# Patient Record
Sex: FEMALE | URBAN - METROPOLITAN AREA
[De-identification: names, ages, dates, MRNs, and addresses within clinical notes are randomized per-mention and may not be internally consistent; named-entity substitution may affect disease eponyms.]

---

## 2019-04-16 ENCOUNTER — OFFICE VISIT (OUTPATIENT)
Dept: URBAN - METROPOLITAN AREA CLINIC 48 | Facility: CLINIC | Age: 79
End: 2019-04-16
Payer: MEDICARE

## 2019-04-16 PROCEDURE — 99213 OFFICE O/P EST LOW 20 MIN: CPT | Performed by: OPHTHALMOLOGY

## 2019-04-16 RX ORDER — BRIMONIDINE TARTRATE 2 MG/ML
0.2 % SOLUTION/ DROPS OPHTHALMIC
Qty: 1 | Refills: 2 | Status: INACTIVE
Start: 2019-04-16 | End: 2019-05-14

## 2019-04-16 RX ORDER — VALACYCLOVIR 500 MG/1
500 MG TABLET ORAL
Qty: 30 | Refills: 0 | Status: INACTIVE
Start: 2019-04-16 | End: 2019-05-17

## 2019-04-16 ASSESSMENT — INTRAOCULAR PRESSURE
OD: 15
OS: 30

## 2019-04-16 NOTE — IMPRESSION/PLAN
Impression: Other corneal scar: H17.89. Plan: not active at this time, continue to monitor. continue:
- PF 1 gtt every other day for 2 week then stop. - Valacyclovir 500mg 1/2 tablet a day 

start :
- Brimonidine tid OS do to elevated pressure RTC 1 week IOP check

## 2019-04-24 ENCOUNTER — OFFICE VISIT (OUTPATIENT)
Dept: URBAN - METROPOLITAN AREA CLINIC 48 | Facility: CLINIC | Age: 79
End: 2019-04-24
Payer: MEDICARE

## 2019-04-24 DIAGNOSIS — H17.89 OTHER CORNEAL SCAR: Primary | ICD-10-CM

## 2019-04-24 PROCEDURE — 92012 INTRM OPH EXAM EST PATIENT: CPT | Performed by: OPHTHALMOLOGY

## 2019-04-24 ASSESSMENT — INTRAOCULAR PRESSURE
OD: 18
OS: 18

## 2019-04-24 NOTE — IMPRESSION/PLAN
Impression: Other corneal scar: H17.89. Plan: not active at this time, pressure decreased with medication, continue to monitor. continue: - Valacyclovir 500mg 1/2 tablet a day PF Refresh  4-12 times a day Discontinue Brimonidine trial basis RTC 1 week IOP check

## 2019-05-07 ENCOUNTER — OFFICE VISIT (OUTPATIENT)
Dept: URBAN - METROPOLITAN AREA CLINIC 48 | Facility: CLINIC | Age: 79
End: 2019-05-07
Payer: MEDICARE

## 2019-05-07 PROCEDURE — 92012 INTRM OPH EXAM EST PATIENT: CPT | Performed by: OPHTHALMOLOGY

## 2019-05-07 ASSESSMENT — INTRAOCULAR PRESSURE: OS: 17

## 2019-05-14 ENCOUNTER — OFFICE VISIT (OUTPATIENT)
Dept: URBAN - METROPOLITAN AREA CLINIC 48 | Facility: CLINIC | Age: 79
End: 2019-05-14
Payer: MEDICARE

## 2019-05-14 PROCEDURE — 92012 INTRM OPH EXAM EST PATIENT: CPT | Performed by: OPHTHALMOLOGY

## 2019-05-14 RX ORDER — GANCICLOVIR 1.5 MG/G
0.15 % GEL OPHTHALMIC
Qty: 1 | Refills: 2 | Status: INACTIVE
Start: 2019-05-14 | End: 2019-07-02

## 2019-05-14 ASSESSMENT — INTRAOCULAR PRESSURE: OS: 24

## 2019-05-14 NOTE — IMPRESSION/PLAN
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 Plan: pressure rise now off brimonidine, new reactivation with decrease of antiviral . Patient to use:
- PF refresh tid OS
- Valacyclovir 500mg tid PO 
- Zirgan tid OS if not covered then we will use Trifluridine tid OS 

RTC Friday IOP check and follow up

## 2019-05-17 ENCOUNTER — OFFICE VISIT (OUTPATIENT)
Dept: URBAN - METROPOLITAN AREA CLINIC 48 | Facility: CLINIC | Age: 79
End: 2019-05-17
Payer: MEDICARE

## 2019-05-17 PROCEDURE — 92012 INTRM OPH EXAM EST PATIENT: CPT | Performed by: OPHTHALMOLOGY

## 2019-05-17 RX ORDER — VALACYCLOVIR 500 MG/1
500 MG TABLET ORAL
Qty: 90 | Refills: 0 | Status: INACTIVE
Start: 2019-05-17 | End: 2019-07-02

## 2019-05-17 ASSESSMENT — INTRAOCULAR PRESSURE: OS: 16

## 2019-05-17 NOTE — IMPRESSION/PLAN
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 Plan: Discussed treatment plan with patient, patient understood. Patient to use:
- PF refresh tid OS
- Valacyclovir 500mg tid PO 
- Zirgan 5 times a day OS 

RTC Wednesday with Dr. Shilpi Estes please make appointment mid morning when Dr. Ambar Ansari is in clinic.

## 2019-06-07 ENCOUNTER — OFFICE VISIT (OUTPATIENT)
Dept: URBAN - METROPOLITAN AREA CLINIC 48 | Facility: CLINIC | Age: 79
End: 2019-06-07
Payer: MEDICARE

## 2019-06-07 PROCEDURE — 92012 INTRM OPH EXAM EST PATIENT: CPT | Performed by: OPHTHALMOLOGY

## 2019-06-07 RX ORDER — PREDNISOLONE ACETATE 10 MG/ML
1 % SUSPENSION/ DROPS OPHTHALMIC
Qty: 3 | Refills: 4 | Status: INACTIVE
Start: 2019-06-07 | End: 2019-07-02

## 2019-06-07 ASSESSMENT — INTRAOCULAR PRESSURE
OS: 19
OD: 16

## 2019-06-07 NOTE — IMPRESSION/PLAN
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 Plan: Discussed treatment plan with patient, patient understood. 
Patient to use:
- PF refresh tid OS
- Valacyclovir 500mg tid PO 
- Zirgan 5 times a day OS 
- PF 10 times a day OS 
- Brimonidine QAM OS

RTC 1 week Dr. Juan M Rios

## 2019-06-14 ENCOUNTER — OFFICE VISIT (OUTPATIENT)
Dept: URBAN - METROPOLITAN AREA CLINIC 48 | Facility: CLINIC | Age: 79
End: 2019-06-14
Payer: MEDICARE

## 2019-06-14 PROCEDURE — 92012 INTRM OPH EXAM EST PATIENT: CPT | Performed by: OPHTHALMOLOGY

## 2019-06-14 ASSESSMENT — INTRAOCULAR PRESSURE: OS: 14

## 2019-06-14 NOTE — IMPRESSION/PLAN
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 There is no history of kidney problems Plan: Discussed treatment plan with patient, patient understood. 
Patient to use:
- PF refresh tid to 6 times a day OS
- Valacyclovir 500mg Bid PO 
- Zirgan tid  times a day OS 
- PF 10 times a day OS 
- Brimonidine QAM OS

RTC 1 week

## 2019-06-20 ENCOUNTER — OFFICE VISIT (OUTPATIENT)
Dept: URBAN - METROPOLITAN AREA CLINIC 48 | Facility: CLINIC | Age: 79
End: 2019-06-20
Payer: MEDICARE

## 2019-06-20 PROCEDURE — 92012 INTRM OPH EXAM EST PATIENT: CPT | Performed by: OPHTHALMOLOGY

## 2019-06-20 ASSESSMENT — INTRAOCULAR PRESSURE: OS: 20

## 2019-06-20 NOTE — IMPRESSION/PLAN
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 Plan: Discussed treatment plan with patient, patient understood. Patient to use:
- PF refresh every hour your not using other drops - Valacyclovir 500mg tid PO 
- Zirgan QD OS 
- PF 8 times a day OS 
- Brimonidine bid  OS

RTC July 2nd with Dr. Yen May If patient has any new decreased vision please call to be seen with Dr. Deneen Bolden

## 2019-07-02 ENCOUNTER — OFFICE VISIT (OUTPATIENT)
Dept: URBAN - METROPOLITAN AREA CLINIC 48 | Facility: CLINIC | Age: 79
End: 2019-07-02
Payer: MEDICARE

## 2019-07-02 PROCEDURE — 92012 INTRM OPH EXAM EST PATIENT: CPT | Performed by: OPHTHALMOLOGY

## 2019-07-02 RX ORDER — GANCICLOVIR 1.5 MG/G
0.15 % GEL OPHTHALMIC
Qty: 1 | Refills: 2 | Status: INACTIVE
Start: 2019-07-02 | End: 2019-07-17

## 2019-07-02 RX ORDER — PREDNISOLONE ACETATE 10 MG/ML
1 % SUSPENSION/ DROPS OPHTHALMIC
Qty: 3 | Refills: 4 | Status: INACTIVE
Start: 2019-07-02 | End: 2019-07-17

## 2019-07-02 RX ORDER — FAMCICLOVIR 250 MG/1
250 MG TABLET ORAL
Qty: 90 | Refills: 2 | Status: INACTIVE
Start: 2019-07-02 | End: 2019-08-01

## 2019-07-02 ASSESSMENT — INTRAOCULAR PRESSURE
OD: 19
OS: 20

## 2019-07-02 NOTE — IMPRESSION/PLAN
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 Patient to use:
- PF refresh every hour your not using other drops - Zirgan tid  OS 
- PF tid x 3 days then bid x 3 days then qd x 3 days - Brimonidine bid  OS
restart Famciclovir 250 mg tid PO Discontinue Valacyclovir RTC 1 week f/up with Dr. Trenton Medrano

## 2019-07-09 ENCOUNTER — OFFICE VISIT (OUTPATIENT)
Dept: URBAN - METROPOLITAN AREA CLINIC 48 | Facility: CLINIC | Age: 79
End: 2019-07-09
Payer: MEDICARE

## 2019-07-09 PROCEDURE — 92012 INTRM OPH EXAM EST PATIENT: CPT | Performed by: OPHTHALMOLOGY

## 2019-07-09 ASSESSMENT — INTRAOCULAR PRESSURE: OS: 19

## 2019-07-09 NOTE — IMPRESSION/PLAN
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 ? epithelial debridement. Plan: Discussed treatment plan with patient, patient understood. Patient to use:
- PF refresh every hour your not using other drops - Zirgan 5 times a day   OS 
- PF  qd x 2 days - Brimonidine bid  OS
- Famciclovir 250 mg tid PO Photo taken today by Dr. Andrea Bowman Refer to Dr. Johanny Simmons Monday RTC  2 weeks with Dr. Andrea Bowman

## 2019-07-17 ENCOUNTER — OFFICE VISIT (OUTPATIENT)
Dept: URBAN - METROPOLITAN AREA CLINIC 29 | Facility: CLINIC | Age: 79
End: 2019-07-17
Payer: MEDICARE

## 2019-07-17 PROCEDURE — 99213 OFFICE O/P EST LOW 20 MIN: CPT | Performed by: OPHTHALMOLOGY

## 2019-07-17 RX ORDER — TRIFLURIDINE 1 G/100ML
1 % SOLUTION OPHTHALMIC
Qty: 1 | Refills: 4 | Status: ACTIVE
Start: 2019-07-17

## 2019-07-17 ASSESSMENT — INTRAOCULAR PRESSURE
OD: 19
OS: 21

## 2019-07-17 NOTE — IMPRESSION/PLAN
Impression: Herpesviral keratitis: B00.52. Condition: established, stable. Symptoms: will treat with meds. Plan: d/c zirgan and change to viroptic qid - ?resistant to zirgan?  no steroid for now, if viroptic works, taper relatively slowly.

## 2019-07-23 ENCOUNTER — OFFICE VISIT (OUTPATIENT)
Dept: URBAN - METROPOLITAN AREA CLINIC 48 | Facility: CLINIC | Age: 79
End: 2019-07-23
Payer: MEDICARE

## 2019-07-23 PROCEDURE — 92012 INTRM OPH EXAM EST PATIENT: CPT | Performed by: OPHTHALMOLOGY

## 2019-07-23 ASSESSMENT — INTRAOCULAR PRESSURE
OS: 20
OD: 21

## 2019-07-23 NOTE — IMPRESSION/PLAN
Impression: Herpesviral keratitis: B00.52. 
? neurotrophic ulcer Plan: Patient to continue: - Trifluridine 5 times a day OS 
- Brimonidine bid OS
- PF aft's Discontinue :
- [facility] RTC friday follow up with Dr. Ace Knox

## 2019-07-26 ENCOUNTER — OFFICE VISIT (OUTPATIENT)
Dept: URBAN - METROPOLITAN AREA CLINIC 48 | Facility: CLINIC | Age: 79
End: 2019-07-26
Payer: MEDICARE

## 2019-07-26 PROCEDURE — 92012 INTRM OPH EXAM EST PATIENT: CPT | Performed by: OPHTHALMOLOGY

## 2019-07-26 PROCEDURE — 92285 EXTERNAL OCULAR PHOTOGRAPHY: CPT | Performed by: OPHTHALMOLOGY

## 2019-07-26 RX ORDER — TRIFLURIDINE 1 G/100ML
1 % SOLUTION OPHTHALMIC
Qty: 15 | Refills: 3 | Status: ACTIVE
Start: 2019-07-26

## 2019-07-26 ASSESSMENT — INTRAOCULAR PRESSURE
OS: 15
OD: 16

## 2019-07-26 NOTE — IMPRESSION/PLAN
Impression: Herpesviral keratitis: B00.52. 
? neurotrophic ulcer Photo taken today  Plan: Patient to continue: - Trifluridine 9 times a day OS 
- Brimonidine bid OS
- PF aft's 
- famciclovir  250 mg tid PO Patient having more pain and sensitive to light OS x 1 day Patient to call office if there is any unbearable pain,
Dr. Montrell Amaro to call Dr. Og Duran to see if PF can be restarted. If so than Dr. Montrell Amaro will call patient to let them know new instruction on medication.  

RTC Wednesday  Dr. Montrell Amaro

## 2019-07-31 ENCOUNTER — OFFICE VISIT (OUTPATIENT)
Dept: URBAN - METROPOLITAN AREA CLINIC 48 | Facility: CLINIC | Age: 79
End: 2019-07-31
Payer: MEDICARE

## 2019-07-31 PROCEDURE — 92012 INTRM OPH EXAM EST PATIENT: CPT | Performed by: OPHTHALMOLOGY

## 2019-07-31 RX ORDER — BRIMONIDINE TARTRATE 2 MG/ML
0.2 % SOLUTION/ DROPS OPHTHALMIC
Qty: 15 | Refills: 2 | Status: INACTIVE
Start: 2019-07-31 | End: 2019-09-05

## 2019-07-31 ASSESSMENT — INTRAOCULAR PRESSURE: OS: 22

## 2019-07-31 NOTE — IMPRESSION/PLAN
Impression: Herpesviral keratitis: B00.52. 
? neurotrophic ulcer Plan: Patient to continue: - Trifluridine 5 times a day OS 
- Brimonidine bid OS
- PF 5 times a day OS 
- PF aft's 5-6 times a day OS 
- famciclovir  250 mg tid PO 

RTC Friday follow up , please don't check pressure don't put any drops in patient's eyes, Dr. De La Cruz Simple to look at [facility] before drops.

## 2019-08-06 ENCOUNTER — OFFICE VISIT (OUTPATIENT)
Dept: URBAN - METROPOLITAN AREA CLINIC 48 | Facility: CLINIC | Age: 79
End: 2019-08-06
Payer: MEDICARE

## 2019-08-06 PROCEDURE — 92012 INTRM OPH EXAM EST PATIENT: CPT | Performed by: OPHTHALMOLOGY

## 2019-08-06 ASSESSMENT — INTRAOCULAR PRESSURE: OS: 16

## 2019-08-06 NOTE — IMPRESSION/PLAN
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
stromal haze in the center vision. Plan: Patient to continue: - Brimonidine QD OS
- PF QD OS 
- PF every 30-60 minutes while awake - Valacyclovir 500 mg tid PO Restart:
Zirgan qid OS Patient to discontinue  Trifluridine RTC Thursday morning,(please do not put any drops in patients eyes, not even to check pressure).

## 2019-08-08 ENCOUNTER — OFFICE VISIT (OUTPATIENT)
Dept: URBAN - METROPOLITAN AREA CLINIC 48 | Facility: CLINIC | Age: 79
End: 2019-08-08
Payer: MEDICARE

## 2019-08-08 DIAGNOSIS — B00.52 HERPESVIRAL KERATITIS: Primary | ICD-10-CM

## 2019-08-08 PROCEDURE — 92012 INTRM OPH EXAM EST PATIENT: CPT | Performed by: OPHTHALMOLOGY

## 2019-08-08 ASSESSMENT — INTRAOCULAR PRESSURE: OS: 21

## 2019-08-08 NOTE — IMPRESSION/PLAN
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
stromal haze in the center vision. Plan: Patient to continue: - Brimonidine BID  OS
- PF BID OS 
- PF every 30-60 minutes while awake - Valacyclovir 500 mg tid PO 
- Trifluridine bid OS
_ofloxacin bid OS [place] Appointment to be made with Dr. Violeta Crow 2 weeks RTC Saturday at [address] [place] [address]

## 2019-08-14 ENCOUNTER — OFFICE VISIT (OUTPATIENT)
Dept: URBAN - METROPOLITAN AREA CLINIC 48 | Facility: CLINIC | Age: 79
End: 2019-08-14
Payer: MEDICARE

## 2019-08-14 PROCEDURE — 92012 INTRM OPH EXAM EST PATIENT: CPT | Performed by: OPHTHALMOLOGY

## 2019-08-14 RX ORDER — OFLOXACIN 3 MG/ML
0.3 % SOLUTION/ DROPS OPHTHALMIC
Qty: 5 | Refills: 2 | Status: INACTIVE
Start: 2019-08-14 | End: 2019-12-03

## 2019-08-14 NOTE — IMPRESSION/PLAN
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 Violeta Crow om Monday at 8:30 am 

Patient to continue: - Brimonidine BID  OS
- PF 9 times a day OS 
- PF AFT qid OS 
- Valacyclovir 500 mg tid PO 
- Trifluridine 9 times a day  OS
- Ofloxacin bid OS



RTC Thursday of next week with Dr. Sultana Pretty

## 2019-08-27 ENCOUNTER — OFFICE VISIT (OUTPATIENT)
Dept: URBAN - METROPOLITAN AREA CLINIC 48 | Facility: CLINIC | Age: 79
End: 2019-08-27
Payer: MEDICARE

## 2019-08-27 PROCEDURE — 92012 INTRM OPH EXAM EST PATIENT: CPT | Performed by: OPHTHALMOLOGY

## 2019-08-27 NOTE — IMPRESSION/PLAN
Impression: Herpesviral keratitis: B00.52. Plan: Patient seen Dr. Milena Ruiz and changed her medication regimen. Discussed possible placement of Prokera plus graft OS. Dr. Andrea Bowman to discuss with Dr. Milena Ruiz. Discontinue PF Patient to continue: - Brimonidine BID  OS
- PF AFT qid OS 
- Valacyclovir 500 mg QD PO 
- Ofloxacin bid OS

RTC Friday follow up with possible placement of Prokera Plus OS. Please get auth if needed.

## 2019-09-06 ENCOUNTER — OFFICE VISIT (OUTPATIENT)
Dept: URBAN - METROPOLITAN AREA CLINIC 48 | Facility: CLINIC | Age: 79
End: 2019-09-06
Payer: MEDICARE

## 2019-09-06 PROCEDURE — 76512 OPH US DX B-SCAN: CPT | Performed by: OPHTHALMOLOGY

## 2019-09-06 PROCEDURE — 67028 INJECTION EYE DRUG: CPT | Performed by: OPHTHALMOLOGY

## 2019-09-06 PROCEDURE — 99214 OFFICE O/P EST MOD 30 MIN: CPT | Performed by: OPHTHALMOLOGY

## 2019-09-06 RX ORDER — DOXYCYCLINE HYCLATE 100 MG/1
100 MG TABLET, COATED ORAL
Qty: 30 | Refills: 0 | Status: ACTIVE
Start: 2019-09-06

## 2019-09-06 RX ORDER — CIPROFLOXACIN HYDROCHLORIDE 3 MG/G
0.3 % OINTMENT OPHTHALMIC
Qty: 3.5 | Refills: 1 | Status: INACTIVE
Start: 2019-09-06 | End: 2019-09-25

## 2019-09-06 ASSESSMENT — INTRAOCULAR PRESSURE: OS: 11

## 2019-09-06 NOTE — IMPRESSION/PLAN
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 OS. Plan: Discussed diagnosis in detail with patient, Dr. Mali Nicholson also explained procedure to patient , patient understands. procedure today: Tap and inject today OS. Pressure OS after tap and inject 9 Patient to use PF qid OS
OFL 12 x a day OS Ciloxan adele qid OS Valtrex  500mg 1 tablet by mouth once a day Doxycycline 100mg 1 tablet by mouth once a day RTC Tomorrow with  at 6 pm. long appointment

## 2019-09-10 ENCOUNTER — OFFICE VISIT (OUTPATIENT)
Dept: URBAN - METROPOLITAN AREA CLINIC 48 | Facility: CLINIC | Age: 79
End: 2019-09-10
Payer: MEDICARE

## 2019-09-10 PROCEDURE — 99212 OFFICE O/P EST SF 10 MIN: CPT | Performed by: OPHTHALMOLOGY

## 2019-09-10 ASSESSMENT — INTRAOCULAR PRESSURE: OS: 16

## 2019-09-10 NOTE — IMPRESSION/PLAN
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  Plan: continue ofloxacin 12 x day, ciloxcin qid, PF qid OS
RTC 2 days

## 2019-09-12 ENCOUNTER — OFFICE VISIT (OUTPATIENT)
Dept: URBAN - METROPOLITAN AREA CLINIC 48 | Facility: CLINIC | Age: 79
End: 2019-09-12
Payer: MEDICARE

## 2019-09-12 DIAGNOSIS — H44.19 OTHER ENDOPHTHALMITIS: Primary | ICD-10-CM

## 2019-09-12 PROCEDURE — 76512 OPH US DX B-SCAN: CPT | Performed by: OPHTHALMOLOGY

## 2019-09-12 PROCEDURE — 92012 INTRM OPH EXAM EST PATIENT: CPT | Performed by: OPHTHALMOLOGY

## 2019-09-12 NOTE — IMPRESSION/PLAN
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 Plan: Patient is doing better, patient to use  Ofloxacin 4 times a day OS, and Prednisolone qid OS and different hour or 2 hour Ciloxan adele qid OD generous amount. Also take Valacyclovir 500 mg once a day , Doxycycline 100mg tablet once a day  and Preservative free qid OD. RTC follow up Monday with Dr. Natalie Schofield to check Hypopyon. 

RTC Wednesday follow up with Dr. Adia Kapadia exam

## 2019-09-16 ENCOUNTER — OFFICE VISIT (OUTPATIENT)
Dept: URBAN - METROPOLITAN AREA CLINIC 48 | Facility: CLINIC | Age: 79
End: 2019-09-16
Payer: MEDICARE

## 2019-09-16 PROCEDURE — 92012 INTRM OPH EXAM EST PATIENT: CPT | Performed by: OPHTHALMOLOGY

## 2019-09-16 ASSESSMENT — INTRAOCULAR PRESSURE: OS: 16

## 2019-09-16 NOTE — IMPRESSION/PLAN
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 Plan: Improving: Continue w/ Ofloxacin 4 times a day OS, and Prednisolone qid OS and different hour or 2 hour Ciloxan adele qid OD, Valacyclovir 500 mg once a day , Doxycycline 100mg tablet once a day  and Preservative free qid OD. RTC follow up Monday with Dr. Michael Moe to check Hypopyon. 

RTC Wednesday follow up with Dr. Alexa Ferguson exam

## 2019-09-18 ENCOUNTER — OFFICE VISIT (OUTPATIENT)
Dept: URBAN - METROPOLITAN AREA CLINIC 48 | Facility: CLINIC | Age: 79
End: 2019-09-18
Payer: MEDICARE

## 2019-09-18 PROCEDURE — 92012 INTRM OPH EXAM EST PATIENT: CPT | Performed by: OPHTHALMOLOGY

## 2019-09-18 ASSESSMENT — INTRAOCULAR PRESSURE: OS: 18

## 2019-09-18 NOTE — IMPRESSION/PLAN
Impression: Herpesviral keratitis: B00.52. Plan: Patient to use  Ciloxan adele qid OS and alternate every 2 hrs with ZIrgan qid OS. PF bid OS. IF this has not healed by Friday may consider a Prokera plus OS. Preservative AFT in between  the gel RTC Friday follow up and possible placement of Prokera plus OS please get Auth.

## 2019-09-20 ENCOUNTER — OFFICE VISIT (OUTPATIENT)
Dept: URBAN - METROPOLITAN AREA CLINIC 48 | Facility: CLINIC | Age: 79
End: 2019-09-20
Payer: MEDICARE

## 2019-09-20 PROCEDURE — 92012 INTRM OPH EXAM EST PATIENT: CPT | Performed by: OPHTHALMOLOGY

## 2019-09-20 ASSESSMENT — INTRAOCULAR PRESSURE: OS: 16

## 2019-09-20 NOTE — IMPRESSION/PLAN
Impression: Herpesviral keratitis: B00.52. Plan: Patient to use  Ciloxan adele qid OS and alternate every 2 hrs with ZIrgan qid OS. PF bid OS. Preservative AFT in between  the gel RTC Sunday follow up (short Exam)

## 2019-09-25 ENCOUNTER — OFFICE VISIT (OUTPATIENT)
Dept: URBAN - METROPOLITAN AREA CLINIC 48 | Facility: CLINIC | Age: 79
End: 2019-09-25
Payer: MEDICARE

## 2019-09-25 PROCEDURE — 92012 INTRM OPH EXAM EST PATIENT: CPT | Performed by: OPHTHALMOLOGY

## 2019-09-25 RX ORDER — CIPROFLOXACIN HYDROCHLORIDE 3 MG/G
0.3 % OINTMENT OPHTHALMIC
Qty: 4 | Refills: 2 | Status: INACTIVE
Start: 2019-09-25 | End: 2019-10-03

## 2019-09-25 ASSESSMENT — INTRAOCULAR PRESSURE: OS: 18

## 2019-09-25 NOTE — IMPRESSION/PLAN
Impression: Herpesviral keratitis: B00.52. Plan: Patient to use  Ciloxan adele qid OS and alternate every 2 hrs with ZIrgan qid OS. PF bid OS. Preservative AFT in between  the gel RTC Friday without IOP check (short Exam)

## 2019-09-27 ENCOUNTER — OFFICE VISIT (OUTPATIENT)
Dept: URBAN - METROPOLITAN AREA CLINIC 48 | Facility: CLINIC | Age: 79
End: 2019-09-27
Payer: MEDICARE

## 2019-09-27 PROCEDURE — 92012 INTRM OPH EXAM EST PATIENT: CPT | Performed by: OPHTHALMOLOGY

## 2019-09-27 ASSESSMENT — INTRAOCULAR PRESSURE: OS: 17

## 2019-09-27 NOTE — IMPRESSION/PLAN
Impression: Herpesviral keratitis: B00.52. Plan: Patient doing better, continue ciloxan qid os, zirgan qid os, and predforte bid os.

## 2019-10-01 ENCOUNTER — OFFICE VISIT (OUTPATIENT)
Dept: URBAN - METROPOLITAN AREA CLINIC 48 | Facility: CLINIC | Age: 79
End: 2019-10-01
Payer: MEDICARE

## 2019-10-01 PROCEDURE — 92012 INTRM OPH EXAM EST PATIENT: CPT | Performed by: OPHTHALMOLOGY

## 2019-10-01 NOTE — IMPRESSION/PLAN
Impression: Herpesviral keratitis: B00.52. Plan: Patient doing better, continue ciloxan Tid os, zirgan Tid os, and predforte bid os, and AFT 2-4 times a day. Will consider increasing PF slowly and cautiously RTC 1 week follow up (short exam)

## 2019-10-10 ENCOUNTER — OFFICE VISIT (OUTPATIENT)
Dept: URBAN - METROPOLITAN AREA CLINIC 48 | Facility: CLINIC | Age: 79
End: 2019-10-10
Payer: MEDICARE

## 2019-10-10 PROCEDURE — 92012 INTRM OPH EXAM EST PATIENT: CPT | Performed by: OPHTHALMOLOGY

## 2019-10-10 NOTE — IMPRESSION/PLAN
Impression: Herpesviral keratitis: B00.52. Plan: Patient doing better, continue ciloxan Bid os, zirgan BID  os, and predforte QD os, and Genteal gel qid OS, and AFT bid OS. Continue Valacyclovir 500 mg qd PO. Will consider increasing PF slowly and cautiously May consider  [address] in the future with Dr. Austin Moore RTC 1 week follow up (short exam)

## 2019-10-16 ENCOUNTER — OFFICE VISIT (OUTPATIENT)
Dept: URBAN - METROPOLITAN AREA CLINIC 48 | Facility: CLINIC | Age: 79
End: 2019-10-16
Payer: MEDICARE

## 2019-10-16 PROCEDURE — 92012 INTRM OPH EXAM EST PATIENT: CPT | Performed by: OPHTHALMOLOGY

## 2019-10-16 NOTE — IMPRESSION/PLAN
Impression: Herpesviral keratitis: B00.52. Plan: Patient has a new dendrite OS.   
Patient to use :
- Ciloxan bid OS
- Zirgan qid OS
- Artificial tears qid OS
- Valacyclovir 500 mg qid  PO

RTC Friday early afternoon follow up ( short exam)

## 2019-10-18 ENCOUNTER — OFFICE VISIT (OUTPATIENT)
Dept: URBAN - METROPOLITAN AREA CLINIC 48 | Facility: CLINIC | Age: 79
End: 2019-10-18
Payer: MEDICARE

## 2019-10-18 PROCEDURE — 92012 INTRM OPH EXAM EST PATIENT: CPT | Performed by: OPHTHALMOLOGY

## 2019-10-18 ASSESSMENT — INTRAOCULAR PRESSURE: OS: 15

## 2019-10-18 NOTE — IMPRESSION/PLAN
Impression: Herpesviral keratitis: B00.52. Plan: Patient has a new dendrite OS. Patient to use :
- Ciloxan bid OS
- Zirgan qid OS
- Artificial tears qid OS
- Valacyclovir 500 mg qid  PO Discussed Amnio disk OS may consider next visit. RTC Tuesday morning follow up with possible Amnio disck placement OS. Please get auth if needed.

## 2019-10-22 ENCOUNTER — OFFICE VISIT (OUTPATIENT)
Dept: URBAN - METROPOLITAN AREA CLINIC 48 | Facility: CLINIC | Age: 79
End: 2019-10-22
Payer: MEDICARE

## 2019-10-22 PROCEDURE — 92012 INTRM OPH EXAM EST PATIENT: CPT | Performed by: OPHTHALMOLOGY

## 2019-10-22 RX ORDER — VALACYCLOVIR 500 MG/1
500 MG TABLET ORAL
Qty: 90 | Refills: 1 | Status: INACTIVE
Start: 2019-10-22 | End: 2020-02-05

## 2019-10-22 ASSESSMENT — INTRAOCULAR PRESSURE: OS: 13

## 2019-10-22 NOTE — IMPRESSION/PLAN
Impression: Herpesviral keratitis: B00.52. Plan: healing dendrite Patient to use :
- Ofloxacin bid OS 
- Zirgan qid OS
- genteal gel 6-8 times a day OS
- Valacyclovir 500 mg BId   PO

RTC  Friday morning follow up (short exam)

## 2019-10-25 ENCOUNTER — OFFICE VISIT (OUTPATIENT)
Dept: URBAN - METROPOLITAN AREA CLINIC 48 | Facility: CLINIC | Age: 79
End: 2019-10-25
Payer: MEDICARE

## 2019-10-25 PROCEDURE — 92012 INTRM OPH EXAM EST PATIENT: CPT | Performed by: OPHTHALMOLOGY

## 2019-10-25 ASSESSMENT — INTRAOCULAR PRESSURE: OS: 14

## 2019-10-25 NOTE — IMPRESSION/PLAN
Impression: Herpesviral keratitis: B00.52. Plan: healing dendrite Patient to use :
- Ofloxacin bid OS 
- Zirgan tid OS
- genteal gel 8 times a day OS
- Valacyclovir 500 mg BId   PO Discussed future appointment with Dr. Angelo Duong RTC  1 week follow up ( short exam)

## 2019-11-01 ENCOUNTER — OFFICE VISIT (OUTPATIENT)
Dept: URBAN - METROPOLITAN AREA CLINIC 48 | Facility: CLINIC | Age: 79
End: 2019-11-01
Payer: MEDICARE

## 2019-11-01 PROCEDURE — 92012 INTRM OPH EXAM EST PATIENT: CPT | Performed by: OPHTHALMOLOGY

## 2019-11-01 ASSESSMENT — INTRAOCULAR PRESSURE: OS: 14

## 2019-11-05 NOTE — IMPRESSION/PLAN
Impression: Herpesviral keratitis: B00.52 OS. Consult with Dr. Vinita Pritchett for  regardin)  Recommendations regarding this persistent and recurrent HSV disease that has been difficult since  for both myself and Dr. Elke Altamirano. Initially controlled with Dr. Elke Altamirano using Tab[address] and Dalia Gilding. More recently has had a neurotrophic ulcer and a bacterial endophthalmitis which are not resolved, but now with persistent dendrite. 2)  Could this represent a HSV2 disease given its relatively aggressive nature. Patient's visiting daugther had cold sore acquired working in a community where HSV2 may be prevelant. 3)  If/once the dendrite has resolved and the stromal haze controlled. Is PTK an option? Plan: Zirgan 5 x day OS
ofloxacin bid OS Stop genteal gel so as not to interfere with Dalia Gilding ATs BID Follow up with Dr. Cosme Pap 1 week

keep appt with Dr. Vinita Pritchett

## 2019-11-07 ENCOUNTER — OFFICE VISIT (OUTPATIENT)
Dept: URBAN - METROPOLITAN AREA CLINIC 48 | Facility: CLINIC | Age: 79
End: 2019-11-07
Payer: MEDICARE

## 2019-11-07 PROCEDURE — 92012 INTRM OPH EXAM EST PATIENT: CPT | Performed by: OPHTHALMOLOGY

## 2019-11-07 NOTE — IMPRESSION/PLAN
Impression: Herpesviral keratitis: B00.52 OS. Consult with Dr. Lolita Chin for  regardin)  Recommendations regarding this persistent and recurrent HSV disease that has been difficult since 2016 for both myself and Dr. Silvana Greer. Initially controlled with Dr. Silvana Greer using Tabaré [address] and [employer]. More recently has had a neurotrophic ulcer and a bacterial endophthalmitis which are not resolved, but now with persistent dendrite. 2)  Could this represent a HSV2 disease given its relatively aggressive nature. Patient's visiting daugther had cold sore acquired working in a community where HSV2 may be prevelant. 3)  If/once the dendrite has resolved and the stromal haze controlled. Is PTK an option? Plan: Given the persistent epi involvement that worsens or reoccurs with steroid will stay off of steroid drops for now. Zirgan 5 x day OS
ofloxacin bid OS
ATs BID Famciclovir 250 mg qid PO Keep appointment with Dr. Lolita Chin  RTC 1 week follow up ( short exam) RTC 2019   ( short Exam)

## 2019-11-13 ENCOUNTER — OFFICE VISIT (OUTPATIENT)
Dept: URBAN - METROPOLITAN AREA CLINIC 48 | Facility: CLINIC | Age: 79
End: 2019-11-13
Payer: MEDICARE

## 2019-11-13 PROCEDURE — 92012 INTRM OPH EXAM EST PATIENT: CPT | Performed by: OPHTHALMOLOGY

## 2019-11-13 NOTE — IMPRESSION/PLAN
Impression: Herpesviral keratitis: B00.52 OS. Consult with Dr. Bess Fernández for  regardin)  Recommendations regarding this persistent and recurrent HSV disease that has been difficult since 2016 for both myself and Dr. Victoriano Adames. Initially controlled with Dr. Victoriano Adames using Tabaré [address] and Dagoberto Mortimer. More recently has had a neurotrophic ulcer and a bacterial endophthalmitis which are now resolved, but now with a persistent dendrite that has almost completely faded and will likely be resolved by next week when patient sees Dr. Bess Fernández. [place] 2)  Could this be a more rare variant of HSV given its relatively persistent nature. Patient does have a history of cold sores after dental work throughout most of her adult life. 3)  If/once the dendrite has resolved and the stromal haze controlled. Is PTK an option? Plan: Given the persistent epi involvement that worsens or reoccurs with steroid will stay off of steroid drops for now, or at least until the dendrite is completely resolved. Keep apt next week with Dr. Bess Fernández regarding further management and possibility of [address], etc (see above). Can call Dr. Mika Dent if questions. [phone] Zirgan 5 x day OS
ofloxacin bid OS
ATs BID Famciclovir 250 mg qid PO Keep appointment with Dr. Bess Fernández  Dr. Mika Dent on

## 2019-11-21 ENCOUNTER — OFFICE VISIT (OUTPATIENT)
Dept: URBAN - METROPOLITAN AREA CLINIC 48 | Facility: CLINIC | Age: 79
End: 2019-11-21
Payer: MEDICARE

## 2019-11-21 PROCEDURE — 92012 INTRM OPH EXAM EST PATIENT: CPT | Performed by: OPHTHALMOLOGY

## 2019-11-21 ASSESSMENT — INTRAOCULAR PRESSURE: OS: 21

## 2019-11-21 NOTE — IMPRESSION/PLAN
Impression: Herpesviral keratitis: B00.52 OS. Consult with Dr. Ricardo Weiner for  regardin)  Recommendations regarding this persistent and recurrent HSV disease that has been difficult since  for both myself and Dr. Latosha Castillo. Initially controlled with Dr. Latosha Castillo using Tab[address] and Wyvonnia Dome. More recently has had a neurotrophic ulcer and a bacterial endophthalmitis which are now resolved, but now with a persistent dendrite that has almost completely faded and will likely be resolved by next week when patient sees Dr. Ricardo Weiner. Burgess Arguello 2)  Could this be a more rare variant of HSV given its relatively persistent nature. Patient does have a history of cold sores after dental work throughout most of her adult life. 3)  If/once the dendrite has resolved and the stromal haze controlled. Is PTK an option? Plan: Given the persistent epi involvement that worsens or reoccurs with steroid will stay off of steroid drops for now, or at least until the dendrite is completely resolved. Keep apt next week with Dr. Ricardo Weiner regarding further management and possibility of [address], etc (see above). Can call Dr. Jay Eisenberg if questions. [phone] Zirgan 4 x day OS
ofloxacin bid OS
ATs BID Famciclovir 250 mg qid PO 

RTC  Dec 3rd fisrt patient in the morning.  Follow up ( short exam)

## 2019-12-03 ENCOUNTER — OFFICE VISIT (OUTPATIENT)
Dept: URBAN - METROPOLITAN AREA CLINIC 48 | Facility: CLINIC | Age: 79
End: 2019-12-03
Payer: MEDICARE

## 2019-12-03 PROCEDURE — 92012 INTRM OPH EXAM EST PATIENT: CPT | Performed by: OPHTHALMOLOGY

## 2019-12-03 RX ORDER — OFLOXACIN 3 MG/ML
0.3 % SOLUTION/ DROPS OPHTHALMIC
Qty: 5 | Refills: 2 | Status: ACTIVE
Start: 2019-12-03

## 2019-12-03 ASSESSMENT — INTRAOCULAR PRESSURE
OD: 19
OS: 14

## 2019-12-03 NOTE — IMPRESSION/PLAN
Impression: Herpesviral keratitis: B00.52 OS. Plan: No dendrite today, recommend patient to cancel appointment with Dr. Malissa Mathew for now will consider in the future if needed. Zirgan 3 x day OS
ofloxacin qd OS
ATs BID Famciclovir 250 mg qid PO 

RTC  7-10 day follow up ( short exam)

## 2019-12-12 ENCOUNTER — OFFICE VISIT (OUTPATIENT)
Dept: URBAN - METROPOLITAN AREA CLINIC 48 | Facility: CLINIC | Age: 79
End: 2019-12-12
Payer: MEDICARE

## 2019-12-12 PROCEDURE — 92012 INTRM OPH EXAM EST PATIENT: CPT | Performed by: OPHTHALMOLOGY

## 2019-12-12 ASSESSMENT — INTRAOCULAR PRESSURE
OD: 20
OS: 12

## 2019-12-12 NOTE — IMPRESSION/PLAN
Impression: Herpesviral keratitis: B00.52 OS. Plan: No dendrite today, recommend patient to cancel appointment with Dr. Flaco Viera for now will consider in the future if needed. Zirgan 2 x day OS
ofloxacin qd OS
ATs BID Famciclovir 250 mg tid PO 

RTC 2 weeks follow  ( short exam)

## 2020-02-05 ENCOUNTER — OFFICE VISIT (OUTPATIENT)
Dept: URBAN - METROPOLITAN AREA CLINIC 48 | Facility: CLINIC | Age: 80
End: 2020-02-05
Payer: MEDICARE

## 2020-02-05 PROCEDURE — 92012 INTRM OPH EXAM EST PATIENT: CPT | Performed by: OPHTHALMOLOGY

## 2020-02-05 ASSESSMENT — INTRAOCULAR PRESSURE
OD: 15
OS: 12

## 2020-02-05 NOTE — IMPRESSION/PLAN
Impression: Herpesviral keratitis: B00.52 OS. Plan: No dendrite today OS, central scar Zirgan QD  OS
ATs TID OS Famciclovir 250 mg tid PO 

RTC 3 weeks follow up